# Patient Record
Sex: FEMALE
[De-identification: names, ages, dates, MRNs, and addresses within clinical notes are randomized per-mention and may not be internally consistent; named-entity substitution may affect disease eponyms.]

---

## 2019-05-17 PROBLEM — Z00.00 ENCOUNTER FOR PREVENTIVE HEALTH EXAMINATION: Status: ACTIVE | Noted: 2019-05-17

## 2019-05-24 ENCOUNTER — TRANSCRIPTION ENCOUNTER (OUTPATIENT)
Age: 19
End: 2019-05-24

## 2019-05-24 ENCOUNTER — APPOINTMENT (OUTPATIENT)
Dept: OBGYN | Facility: CLINIC | Age: 19
End: 2019-05-24
Payer: MEDICAID

## 2019-05-24 VITALS
SYSTOLIC BLOOD PRESSURE: 112 MMHG | WEIGHT: 168 LBS | DIASTOLIC BLOOD PRESSURE: 72 MMHG | HEIGHT: 62 IN | OXYGEN SATURATION: 98 % | BODY MASS INDEX: 30.91 KG/M2 | HEART RATE: 79 BPM

## 2019-05-24 DIAGNOSIS — Z11.3 ENCOUNTER FOR SCREENING FOR INFECTIONS WITH A PREDOMINANTLY SEXUAL MODE OF TRANSMISSION: ICD-10-CM

## 2019-05-24 DIAGNOSIS — Z01.419 ENCOUNTER FOR GYNECOLOGICAL EXAMINATION (GENERAL) (ROUTINE) W/OUT ABNORMAL FINDINGS: ICD-10-CM

## 2019-05-24 DIAGNOSIS — Z63.4 DISAPPEARANCE AND DEATH OF FAMILY MEMBER: ICD-10-CM

## 2019-05-24 DIAGNOSIS — Z84.89 FAMILY HISTORY OF OTHER SPECIFIED CONDITIONS: ICD-10-CM

## 2019-05-24 DIAGNOSIS — Z78.9 OTHER SPECIFIED HEALTH STATUS: ICD-10-CM

## 2019-05-24 DIAGNOSIS — Z87.09 PERSONAL HISTORY OF OTHER DISEASES OF THE RESPIRATORY SYSTEM: ICD-10-CM

## 2019-05-24 DIAGNOSIS — Z80.0 FAMILY HISTORY OF MALIGNANT NEOPLASM OF DIGESTIVE ORGANS: ICD-10-CM

## 2019-05-24 PROCEDURE — 99385 PREV VISIT NEW AGE 18-39: CPT

## 2019-05-24 SDOH — SOCIAL STABILITY - SOCIAL INSECURITY: DISSAPEARANCE AND DEATH OF FAMILY MEMBER: Z63.4

## 2019-05-25 PROBLEM — Z87.09 HISTORY OF ASTHMA: Status: RESOLVED | Noted: 2019-05-25 | Resolved: 2019-05-25

## 2019-05-25 LAB
C TRACH RRNA SPEC QL NAA+PROBE: NOT DETECTED
N GONORRHOEA RRNA SPEC QL NAA+PROBE: NOT DETECTED
SOURCE AMPLIFICATION: NORMAL

## 2019-05-25 NOTE — PHYSICAL EXAM
[Awake] : awake [Alert] : alert [Acute Distress] : no acute distress [LAD] : no lymphadenopathy [Thyroid Nodule] : no thyroid nodule [Mass] : no breast mass [Goiter] : no goiter [Nipple Discharge] : no nipple discharge [Axillary LAD] : no axillary lymphadenopathy [Soft] : soft [Tender] : non tender [Distended] : not distended [H/Smegaly] : no hepatosplenomegaly [Oriented x3] : oriented to person, place, and time [Depressed Mood] : not depressed [Flat Affect] : affect not flat

## 2019-05-25 NOTE — HISTORY OF PRESENT ILLNESS
[Good] : being in good health [Reproductive Age] : is of reproductive age [Contraception] : uses contraception [Condoms] : uses condoms [Sexually Active] : is sexually active [Yes] : yes

## 2019-05-25 NOTE — COUNSELING
[Breast Self Exam] : breast self exam [Nutrition] : nutrition [Exercise] : exercise [STD (testing, results, tx)] : STD (testing, results, tx) [Vitamins/Supplements] : vitamins/supplements [Contraception] : contraception [Safe Sexual Practices] : safe sexual practices [Emergency Contraception] : emergency contraception

## 2019-07-02 DIAGNOSIS — N76.0 ACUTE VAGINITIS: ICD-10-CM

## 2019-07-02 RX ORDER — TERCONAZOLE 8 MG/G
0.8 CREAM VAGINAL
Qty: 1 | Refills: 3 | Status: ACTIVE | COMMUNITY
Start: 2019-07-02 | End: 1900-01-01

## 2020-12-21 PROBLEM — N76.0 ACUTE VAGINITIS: Status: RESOLVED | Noted: 2019-07-02 | Resolved: 2020-12-21

## 2020-12-21 PROBLEM — Z01.419 ENCOUNTER FOR ANNUAL ROUTINE GYNECOLOGICAL EXAMINATION: Status: RESOLVED | Noted: 2019-05-24 | Resolved: 2020-12-21

## 2021-05-21 ENCOUNTER — APPOINTMENT (OUTPATIENT)
Dept: FAMILY MEDICINE | Facility: CLINIC | Age: 21
End: 2021-05-21

## 2023-03-08 NOTE — CHIEF COMPLAINT
Prior Authorization Approval    Authorization Effective Date: 3/7/2023  Authorization Expiration Date: 9/7/2023  Medication: lidocaine (LIDODERM) 5 % patch  Approved Dose/Quantity:   Reference #:     Insurance Company: [employer] ([facility]) - Phone [phone] Fax [phone]  Expected CoPay:       CoPay Card Available:      Foundation Assistance Needed:    Which Pharmacy is filling the prescription (Not needed for infusion/clinic administered): [facility] - [address]   Pharmacy Notified: No  Patient Notified: Yes-LVM. Patient may call her pharmacy to fill medication if needed.        [Annual Visit] : annual visit

## 2024-07-25 ENCOUNTER — APPOINTMENT (OUTPATIENT)
Dept: FAMILY MEDICINE | Facility: CLINIC | Age: 24
End: 2024-07-25
Payer: COMMERCIAL

## 2024-07-25 ENCOUNTER — NON-APPOINTMENT (OUTPATIENT)
Age: 24
End: 2024-07-25

## 2024-07-25 VITALS
SYSTOLIC BLOOD PRESSURE: 110 MMHG | HEART RATE: 66 BPM | OXYGEN SATURATION: 98 % | DIASTOLIC BLOOD PRESSURE: 80 MMHG | BODY MASS INDEX: 29.63 KG/M2 | RESPIRATION RATE: 18 BRPM | WEIGHT: 161 LBS | TEMPERATURE: 97.6 F | HEIGHT: 62 IN

## 2024-07-25 DIAGNOSIS — Z80.0 FAMILY HISTORY OF MALIGNANT NEOPLASM OF DIGESTIVE ORGANS: ICD-10-CM

## 2024-07-25 DIAGNOSIS — Z13.29 ENCOUNTER FOR SCREENING FOR OTHER SUSPECTED ENDOCRINE DISORDER: ICD-10-CM

## 2024-07-25 DIAGNOSIS — Z80.6 FAMILY HISTORY OF LEUKEMIA: ICD-10-CM

## 2024-07-25 DIAGNOSIS — Z00.00 ENCOUNTER FOR GENERAL ADULT MEDICAL EXAMINATION W/OUT ABNORMAL FINDINGS: ICD-10-CM

## 2024-07-25 DIAGNOSIS — R09.82 POSTNASAL DRIP: ICD-10-CM

## 2024-07-25 DIAGNOSIS — Z13.1 ENCOUNTER FOR SCREENING FOR DIABETES MELLITUS: ICD-10-CM

## 2024-07-25 DIAGNOSIS — F12.90 CANNABIS USE, UNSPECIFIED, UNCOMPLICATED: ICD-10-CM

## 2024-07-25 PROCEDURE — 99385 PREV VISIT NEW AGE 18-39: CPT

## 2024-07-25 PROCEDURE — 96127 BRIEF EMOTIONAL/BEHAV ASSMT: CPT

## 2024-07-25 RX ORDER — OMEPRAZOLE 20 MG/1
20 CAPSULE, DELAYED RELEASE ORAL AT BEDTIME
Qty: 30 | Refills: 1 | Status: ACTIVE | COMMUNITY
Start: 2024-07-25 | End: 1900-01-01

## 2024-07-25 RX ORDER — FLUTICASONE PROPIONATE 50 UG/1
50 SPRAY, METERED NASAL
Qty: 1 | Refills: 3 | Status: ACTIVE | COMMUNITY
Start: 2024-07-25 | End: 1900-01-01

## 2024-07-25 RX ORDER — AZELASTINE HYDROCHLORIDE 137 UG/1
137 SPRAY, METERED NASAL
Qty: 1 | Refills: 3 | Status: ACTIVE | COMMUNITY
Start: 2024-07-25 | End: 1900-01-01

## 2024-07-25 NOTE — ASSESSMENT
[FreeTextEntry1] : # HM  f/u AV labs Set up appointment w. GYN for Pap smear  # postnasal drip Trial of allergy meds - flonase, azelastine, zyrtec nightly for at least 2-3 weeks If no improvement trial of omeprazole qhs If none of these work will consider referring to ENT/GI  f/u in 1 year or PRN

## 2024-07-25 NOTE — HISTORY OF PRESENT ILLNESS
[FreeTextEntry1] : est care, discuss acid reflux [de-identified] : Pt comes in to est care and discuss acid reflux.  In May 2024 she thought she was developing a lot of saliva, has been waking up feeling nauseous. She has been vomiting up nasal mucus in mornings, sometimes white foam. Feels like hacking it up. She also has been getting hot flashes after these episodes. Never had coffee ground vomit. Seems like rather coming from throat vs stomach, it is not dry heaving. No fever/chills, abd pain, diarrhea, blood in stool, black tarry stool. Appetite is normal. No association w/ food, it's just in morning. She has dust allergy, no other allergies. Hasn't tried any OTC medications, but has been trying to drink more water and eat healthier. No change in symptoms depending on what she ate night before. No coughing, sore throat, facial pain/pressure, earache.

## 2024-07-25 NOTE — HEALTH RISK ASSESSMENT
[2 - 4 times a month (2 pts)] : 2-4 times a month (2 points) [1 or 2 (0 pts)] : 1 or 2 (0 points) [Never (0 pts)] : Never (0 points) [Yes] : In the past 12 months have you used drugs other than those required for medical reasons? Yes [0] : 2) Feeling down, depressed, or hopeless: Not at all (0) [PHQ-2 Negative - No further assessment needed] : PHQ-2 Negative - No further assessment needed [Never] : Never [HIV test declined] : HIV test declined [Hepatitis C test declined] : Hepatitis C test declined [Audit-CScore] : 2 [de-identified] : marijuana inhaled a few times per week [[id number]] : 0 [PapSmearComments] : never had before

## 2024-07-25 NOTE — HEALTH RISK ASSESSMENT
[2 - 4 times a month (2 pts)] : 2-4 times a month (2 points) [1 or 2 (0 pts)] : 1 or 2 (0 points) [Never (0 pts)] : Never (0 points) [Yes] : In the past 12 months have you used drugs other than those required for medical reasons? Yes [0] : 2) Feeling down, depressed, or hopeless: Not at all (0) [PHQ-2 Negative - No further assessment needed] : PHQ-2 Negative - No further assessment needed [Never] : Never [HIV test declined] : HIV test declined [Hepatitis C test declined] : Hepatitis C test declined [Audit-CScore] : 2 [de-identified] : marijuana inhaled a few times per week [[id number]] : 0 [PapSmearComments] : never had before

## 2024-07-25 NOTE — HISTORY OF PRESENT ILLNESS
[FreeTextEntry1] : est care, discuss acid reflux [de-identified] : Pt comes in to est care and discuss acid reflux.  In May 2024 she thought she was developing a lot of saliva, has been waking up feeling nauseous. She has been vomiting up nasal mucus in mornings, sometimes white foam. Feels like hacking it up. She also has been getting hot flashes after these episodes. Never had coffee ground vomit. Seems like rather coming from throat vs stomach, it is not dry heaving. No fever/chills, abd pain, diarrhea, blood in stool, black tarry stool. Appetite is normal. No association w/ food, it's just in morning. She has dust allergy, no other allergies. Hasn't tried any OTC medications, but has been trying to drink more water and eat healthier. No change in symptoms depending on what she ate night before. No coughing, sore throat, facial pain/pressure, earache.